# Patient Record
Sex: MALE | Race: WHITE | NOT HISPANIC OR LATINO | URBAN - METROPOLITAN AREA
[De-identification: names, ages, dates, MRNs, and addresses within clinical notes are randomized per-mention and may not be internally consistent; named-entity substitution may affect disease eponyms.]

---

## 2017-07-31 ENCOUNTER — IMPORTED ENCOUNTER (OUTPATIENT)
Dept: URBAN - METROPOLITAN AREA CLINIC 50 | Facility: CLINIC | Age: 72
End: 2017-07-31

## 2017-08-07 ENCOUNTER — IMPORTED ENCOUNTER (OUTPATIENT)
Dept: URBAN - METROPOLITAN AREA CLINIC 50 | Facility: CLINIC | Age: 72
End: 2017-08-07

## 2017-10-19 ENCOUNTER — IMPORTED ENCOUNTER (OUTPATIENT)
Dept: URBAN - METROPOLITAN AREA CLINIC 50 | Facility: CLINIC | Age: 72
End: 2017-10-19

## 2018-11-12 ENCOUNTER — IMPORTED ENCOUNTER (OUTPATIENT)
Dept: URBAN - METROPOLITAN AREA CLINIC 50 | Facility: CLINIC | Age: 73
End: 2018-11-12

## 2020-02-20 ENCOUNTER — IMPORTED ENCOUNTER (OUTPATIENT)
Dept: URBAN - METROPOLITAN AREA CLINIC 50 | Facility: CLINIC | Age: 75
End: 2020-02-20

## 2020-09-21 ENCOUNTER — IMPORTED ENCOUNTER (OUTPATIENT)
Dept: URBAN - METROPOLITAN AREA CLINIC 50 | Facility: CLINIC | Age: 75
End: 2020-09-21

## 2021-04-18 ASSESSMENT — TONOMETRY
OD_IOP_MMHG: 21
OS_IOP_MMHG: 20
OD_IOP_MMHG: 21
OD_IOP_MMHG: 21
OS_IOP_MMHG: 23
OD_IOP_MMHG: 20
OS_IOP_MMHG: 22

## 2021-04-18 ASSESSMENT — VISUAL ACUITY
OS_CC: J2@ 14 IN
OS_SC: 20/30
OD_SC: 20/20-
OD_CC: J2@ 14 IN
OS_CC: J1+
OS_SC: 20/30
OD_CC: J1+
OD_SC: 20/20
OD_SC: 20/20
OS_PH: 20/20
OS_SC: 20/20-1

## 2021-04-18 ASSESSMENT — PACHYMETRY
OD_CT_UM: 533
OS_CT_UM: 537
OS_CT_UM: 537
OD_CT_UM: 533

## 2022-01-13 ENCOUNTER — PREPPED CHART (OUTPATIENT)
Dept: URBAN - METROPOLITAN AREA CLINIC 52 | Facility: CLINIC | Age: 77
End: 2022-01-13

## 2022-03-10 ENCOUNTER — COMPREHENSIVE EXAM (OUTPATIENT)
Dept: URBAN - METROPOLITAN AREA CLINIC 52 | Facility: CLINIC | Age: 77
End: 2022-03-10

## 2022-03-10 DIAGNOSIS — H35.371: ICD-10-CM

## 2022-03-10 DIAGNOSIS — H35.372: ICD-10-CM

## 2022-03-10 PROCEDURE — 92015 DETERMINE REFRACTIVE STATE: CPT

## 2022-03-10 PROCEDURE — 92014 COMPRE OPH EXAM EST PT 1/>: CPT

## 2022-03-10 PROCEDURE — 92134 CPTRZ OPH DX IMG PST SGM RTA: CPT

## 2022-03-10 ASSESSMENT — VISUAL ACUITY
OS_GLARE: 20/50
OS_GLARE: 20/60
OS_SC: 20/100
OU_SC: 20/25-2
OD_GLARE: 20/30
OS_PH: 20/30-1
OD_GLARE: 20/40
OD_SC: 20/25-2

## 2022-03-10 ASSESSMENT — TONOMETRY
OD_IOP_MMHG: 12
OS_IOP_MMHG: 14

## 2022-03-10 NOTE — PATIENT DISCUSSION
No recurrence since last visit. Secondary to ERM. Stable will continue to monitor and follow up with Dr. Brie Pike.

## 2022-03-10 NOTE — PATIENT DISCUSSION
Patient follows with Dr. Meredith Pringle. No changes last visit. Advised to continue follow ups [facility].

## 2023-04-13 ENCOUNTER — COMPREHENSIVE EXAM (OUTPATIENT)
Dept: URBAN - METROPOLITAN AREA CLINIC 52 | Facility: CLINIC | Age: 78
End: 2023-04-13

## 2023-04-13 DIAGNOSIS — H04.123: ICD-10-CM

## 2023-04-13 DIAGNOSIS — H35.373: ICD-10-CM

## 2023-04-13 DIAGNOSIS — H43.813: ICD-10-CM

## 2023-04-13 PROCEDURE — 92014 COMPRE OPH EXAM EST PT 1/>: CPT

## 2023-04-13 PROCEDURE — 92134 CPTRZ OPH DX IMG PST SGM RTA: CPT

## 2023-04-13 ASSESSMENT — VISUAL ACUITY
OS_GLARE: 20/25
OD_GLARE: 20/30
OU_SC: 20/20
OU_SC: J1
OS_SC: 20/20-1
OS_GLARE: 20/30
OD_SC: 20/20
OD_GLARE: 20/25

## 2023-04-13 ASSESSMENT — TONOMETRY
OS_IOP_MMHG: 16
OS_IOP_MMHG: 17
OD_IOP_MMHG: 15
OD_IOP_MMHG: 14

## 2024-04-18 ENCOUNTER — COMPREHENSIVE EXAM (OUTPATIENT)
Dept: URBAN - METROPOLITAN AREA CLINIC 52 | Facility: CLINIC | Age: 79
End: 2024-04-18

## 2024-04-18 DIAGNOSIS — H35.352: ICD-10-CM

## 2024-04-18 DIAGNOSIS — H43.813: ICD-10-CM

## 2024-04-18 DIAGNOSIS — H04.123: ICD-10-CM

## 2024-04-18 DIAGNOSIS — H35.373: ICD-10-CM

## 2024-04-18 PROCEDURE — 92015 DETERMINE REFRACTIVE STATE: CPT

## 2024-04-18 PROCEDURE — 99214 OFFICE O/P EST MOD 30 MIN: CPT

## 2024-04-18 PROCEDURE — 92134 CPTRZ OPH DX IMG PST SGM RTA: CPT

## 2024-04-18 ASSESSMENT — TONOMETRY
OS_IOP_MMHG: 17
OD_IOP_MMHG: 17
OD_IOP_MMHG: 16
OS_IOP_MMHG: 16

## 2024-04-18 ASSESSMENT — KERATOMETRY
OD_K2POWER_DIOPTERS: 45.50
OD_K1POWER_DIOPTERS: 44.00
OS_K2POWER_DIOPTERS: 46.00
OD_AXISANGLE_DEGREES: 093
OD_AXISANGLE2_DEGREES: 3
OS_AXISANGLE_DEGREES: 078
OS_AXISANGLE2_DEGREES: 168
OS_K1POWER_DIOPTERS: 44.75

## 2024-04-18 ASSESSMENT — VISUAL ACUITY
OD_SC: 20/25-1
OD_GLARE: 20/20
OD_GLARE: 20/20
OS_GLARE: 20/20
OS_PH: 20/25-2
OS_GLARE: 20/25
OS_SC: 20/30+2
OU_SC: 20/20
OU_SC: J1@16"

## 2025-04-29 ENCOUNTER — COMPREHENSIVE EXAM (OUTPATIENT)
Age: 80
End: 2025-04-29

## 2025-04-29 DIAGNOSIS — H04.123: ICD-10-CM

## 2025-04-29 DIAGNOSIS — H52.4: ICD-10-CM

## 2025-04-29 DIAGNOSIS — H43.813: ICD-10-CM

## 2025-04-29 DIAGNOSIS — H35.373: ICD-10-CM

## 2025-04-29 PROCEDURE — 99214 OFFICE O/P EST MOD 30 MIN: CPT

## 2025-04-29 PROCEDURE — 92134 CPTRZ OPH DX IMG PST SGM RTA: CPT

## 2025-04-29 PROCEDURE — 92015 DETERMINE REFRACTIVE STATE: CPT
